# Patient Record
(demographics unavailable — no encounter records)

---

## 2025-03-21 NOTE — ASSESSMENT
[FreeTextEntry1] : 26 yo M with diabetes on metformin presenting to clinic to establish care, c/o abdominal pain, polyurea, has been hospitalized 1.5 years ago for DKA, POCT glucose >600 in clinic, will send patient to ED to r/o DKA and supportive care.

## 2025-03-21 NOTE — INTERPRETER SERVICES
[Time Spent: ____ minutes] : Total time spent using  services: [unfilled] minutes. The patient's primary language is not English thus required  services. [Interpreters_IDNumber] : [id number] [Interpreters_FullName] : Lonnie [TWNoteComboBox1] : [place]

## 2025-03-21 NOTE — PHYSICAL EXAM
[No Edema] : there was no peripheral edema [Soft] : abdomen soft [Non Tender] : non-tender [Non-distended] : non-distended [Normal] : soft, non-tender, non-distended, no masses palpated, no HSM and normal bowel sounds [Normal Posterior Cervical Nodes] : no posterior cervical lymphadenopathy [Normal Anterior Cervical Nodes] : no anterior cervical lymphadenopathy [de-identified] : b/l feet clean, no lesions/cuts/wounds

## 2025-03-21 NOTE — REVIEW OF SYSTEMS
[Frequency] : frequency [Negative] : Integumentary [Fever] : no fever [Chills] : no chills [Abdominal Pain] : no abdominal pain [Nausea] : no nausea [Diarrhea] : no diarrhea [Vomiting] : no vomiting [Dysuria] : no dysuria [Hematuria] : no hematuria

## 2025-03-21 NOTE — HISTORY OF PRESENT ILLNESS
[FreeTextEntry1] : Presenting for check on diabetes  [de-identified] : 24 yo with Hx of diabetes   Reports 1.5 years history of diabetes, was diagnosed in [place], came to [place]. Patient was c/o of polyuria and abdominal pain which prompted visit to the emergency department on initial encounter 1.5 years ago, was diagnosed with diabetes, was initially started on insulin and then metformin. Patient not sure if he has DM type 1 or type 2, positive diabetes in mother, unsure what type. Today, patient still c/o of polyurea, abdominal pain, no NorV.